# Patient Record
Sex: FEMALE | URBAN - METROPOLITAN AREA
[De-identification: names, ages, dates, MRNs, and addresses within clinical notes are randomized per-mention and may not be internally consistent; named-entity substitution may affect disease eponyms.]

---

## 2020-12-31 ENCOUNTER — OFFICE VISIT (OUTPATIENT)
Dept: URBAN - METROPOLITAN AREA CLINIC 98 | Facility: CLINIC | Age: 30
End: 2020-12-31
Payer: OTHER GOVERNMENT

## 2020-12-31 ENCOUNTER — DASHBOARD ENCOUNTERS (OUTPATIENT)
Age: 30
End: 2020-12-31

## 2020-12-31 VITALS
SYSTOLIC BLOOD PRESSURE: 126 MMHG | HEIGHT: 64 IN | TEMPERATURE: 97 F | HEART RATE: 93 BPM | WEIGHT: 155.6 LBS | BODY MASS INDEX: 26.56 KG/M2 | DIASTOLIC BLOOD PRESSURE: 83 MMHG

## 2020-12-31 DIAGNOSIS — R79.89 LFT ELEVATION: ICD-10-CM

## 2020-12-31 PROCEDURE — G8427 DOCREV CUR MEDS BY ELIG CLIN: HCPCS | Performed by: INTERNAL MEDICINE

## 2020-12-31 PROCEDURE — 99203 OFFICE O/P NEW LOW 30 MIN: CPT | Performed by: INTERNAL MEDICINE

## 2020-12-31 PROCEDURE — G8420 CALC BMI NORM PARAMETERS: HCPCS | Performed by: INTERNAL MEDICINE

## 2020-12-31 RX ORDER — METHENAMINE, SODIUM PHOSPHATE, MONOBASIC, MONOHYDRATE, PHENYL SALICYLATE, METHYLENE BLUE, AND HYOSCYAMINE SULFATE 118; 40.8; 36; 10; .12 MG/1; MG/1; MG/1; MG/1; MG/1
1 CAPSULE CAPSULE ORAL
Status: ACTIVE | COMMUNITY

## 2020-12-31 NOTE — HPI-OTHER HISTORIES
Pt was deployed to [facility] Took malaria prophylaxis Malarone last dose around 2/2020 liver tests raised  have trended downward pt's mother has hemochromatosis pt stopped BCP 2 months ago minimal EtOH, stopped completely had ultrasound normal liver

## 2021-01-06 LAB
ACTIN (SMOOTH MUSCLE) ANTIBODY: 6
ALBUMIN: 4.5
ALKALINE PHOSPHATASE: 102
ALT (SGPT): 61
ANTI-CENTROMERE B ANTIBODIES: <0.2
ANTI-DNA (DS) AB QN: 1
ANTI-JO-1: <0.2
ANTICHROMATIN ANTIBODIES: <0.2
ANTISCLERODERMA-70 ANTIBODIES: <0.2
AST (SGOT): 31
BILIRUBIN, DIRECT: 0.19
BILIRUBIN, TOTAL: 0.6
CERULOPLASMIN: 24.5
GGT: 35
HEREDITARY  HEMOCHROMATOSIS: (no result)
Lab: (no result)
PROTEIN, TOTAL: 7.1
RNP ANTIBODIES: <0.2
SJOGREN'S ANTI-SS-A: <0.2
SJOGREN'S ANTI-SS-B: <0.2
SMITH ANTIBODIES: <0.2

## 2023-03-27 ENCOUNTER — WEB ENCOUNTER (OUTPATIENT)
Dept: URBAN - METROPOLITAN AREA CLINIC 98 | Facility: CLINIC | Age: 33
End: 2023-03-27